# Patient Record
Sex: MALE | Race: WHITE
[De-identification: names, ages, dates, MRNs, and addresses within clinical notes are randomized per-mention and may not be internally consistent; named-entity substitution may affect disease eponyms.]

---

## 2021-03-29 PROBLEM — Z00.00 ENCOUNTER FOR PREVENTIVE HEALTH EXAMINATION: Status: ACTIVE | Noted: 2021-03-29

## 2021-03-30 ENCOUNTER — APPOINTMENT (OUTPATIENT)
Dept: CARDIOLOGY | Facility: CLINIC | Age: 76
End: 2021-03-30
Payer: MEDICARE

## 2021-03-30 ENCOUNTER — NON-APPOINTMENT (OUTPATIENT)
Age: 76
End: 2021-03-30

## 2021-03-30 VITALS
SYSTOLIC BLOOD PRESSURE: 155 MMHG | OXYGEN SATURATION: 99 % | HEART RATE: 65 BPM | WEIGHT: 173 LBS | TEMPERATURE: 98 F | DIASTOLIC BLOOD PRESSURE: 70 MMHG

## 2021-03-30 VITALS — SYSTOLIC BLOOD PRESSURE: 144 MMHG | DIASTOLIC BLOOD PRESSURE: 70 MMHG

## 2021-03-30 PROCEDURE — 99204 OFFICE O/P NEW MOD 45 MIN: CPT

## 2021-03-30 PROCEDURE — 93000 ELECTROCARDIOGRAM COMPLETE: CPT

## 2021-03-30 RX ORDER — METOPROLOL TARTRATE 50 MG/1
50 TABLET, FILM COATED ORAL DAILY
Qty: 90 | Refills: 3 | Status: DISCONTINUED | COMMUNITY
End: 2021-03-30

## 2021-03-30 RX ORDER — ASPIRIN 81 MG
81 TABLET, DELAYED RELEASE (ENTERIC COATED) ORAL
Refills: 0 | Status: DISCONTINUED | COMMUNITY
End: 2021-03-30

## 2021-03-30 RX ORDER — OXYBUTYNIN CHLORIDE 10 MG/1
10 TABLET, EXTENDED RELEASE ORAL DAILY
Qty: 90 | Refills: 3 | Status: ACTIVE | COMMUNITY

## 2021-03-30 NOTE — HISTORY OF PRESENT ILLNESS
[FreeTextEntry1] : 76-year-old male with longstanding history of hypertension, recently hospitalized at [facility] with atrial fibrillation/rapid ventricular rates.\par \par Patient denies any prior history of coronary artery disease and states he had a stress test done approximately 1 year ago by a cardiologist in [place].  He was suffering at that time from worsening leg pains and was ruled out for peripheral arterial disease as well.  He has a longstanding history of hypertension which has been somewhat controlled (states his blood pressures generally in the 130s to 140s) on beta-blocker and clonidine.\par \par Patient was moving his home contents from [place] to [place] and noticed after a strenuous day of activity that he felt nauseous and weak and was brought by ambulance to the hospital.  He stayed there for 2 days and had cardiac testing done.  Discharged on a combination of beta-blocker, clonidine, amlodipine, Eliquis and Lipitor.  The day after his discharge he felt very lightheaded and nauseous so he self discontinued his amlodipine and Lipitor and has only been taking his Eliquis sporadically.  Currently he is feeling back to baseline.  He denies any further chest pain, dyspnea, palpitations or syncopal episodes.\par \par He is an ex-smoker quit 25 years ago.  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  No significant cardiac family history.   with 2 children.

## 2021-03-30 NOTE — DISCUSSION/SUMMARY
[FreeTextEntry1] : We had a lengthy discussion regarding the nature of atrial fibrillation and the need to be on full dose anticoagulation to prevent the sequelae of cerebrovascular events.  Patient self administers low-dose aspirin in addition to his anticoagulants and I informed him this may put him at increased risk for GI bleed and if there is no indication of coronary disease, he is better off just staying on the Eliquis alone.  His most recent labs done by his primary care physician showed a normal cholesterol level and although there is some benefit to atrial fibrillation management with statins, the patient feels that this caused him significant amount of side effects.\par His blood pressures remain elevated and some of this may be due to whitecoat hypertension as he claims at his primary care physician his systolic blood pressure was in the 120s; I would like to get him onto a simpler medical regimen and to eventually wean him off clonidine to manage him with just a beta-blocker and an angiotensin receptor blocker.  His clonidine dose was cut in half 0.1 mg twice daily and he was started on low-dose valsartan.  He is to continue monitoring his blood pressures and bring me a log at his next visit and we will hopefully discontinue clonidine and titrate ARB as needed.  I have requested copies of his testing done at the hospital so that I can review them with the patient.  The patient was told that if he feels dizziness or his blood pressures seem excessively high or low that he should call the office.\par His [id number] score was 3 which is moderate to high risk for thromboembolic events while he is in atrial fibrillation.  Currently he is in sinus rhythm and will likely reassess at a later date to see whether or not there is occult dysrhythmia and consider risk benefit of continued anticoagulation.

## 2021-03-30 NOTE — PHYSICAL EXAM
[General Appearance - Well Developed] : well developed [Normal Appearance] : normal appearance [Well Groomed] : well groomed [General Appearance - Well Nourished] : well nourished [No Deformities] : no deformities [General Appearance - In No Acute Distress] : no acute distress [Normal Conjunctiva] : the conjunctiva exhibited no abnormalities [Eyelids - No Xanthelasma] : the eyelids demonstrated no xanthelasmas [Normal Jugular Venous A Waves Present] : normal jugular venous A waves present [Normal Jugular Venous V Waves Present] : normal jugular venous V waves present [No Jugular Venous Lane A Waves] : no jugular venous lane A waves [Heart Rate And Rhythm] : heart rate and rhythm were normal [Heart Sounds] : normal S1 and S2 [Murmurs] : no murmurs present [Respiration, Rhythm And Depth] : normal respiratory rhythm and effort [Exaggerated Use Of Accessory Muscles For Inspiration] : no accessory muscle use [Auscultation Breath Sounds / Voice Sounds] : lungs were clear to auscultation bilaterally [Abdomen Soft] : soft [Abdomen Tenderness] : non-tender [Abdomen Mass (___ Cm)] : no abdominal mass palpated [Abnormal Walk] : normal gait [Gait - Sufficient For Exercise Testing] : the gait was sufficient for exercise testing [Nail Clubbing] : no clubbing of the fingernails [Cyanosis, Localized] : no localized cyanosis [Petechial Hemorrhages (___cm)] : no petechial hemorrhages [Skin Color & Pigmentation] : normal skin color and pigmentation [] : no rash [No Venous Stasis] : no venous stasis [Skin Lesions] : no skin lesions [No Skin Ulcers] : no skin ulcer [No Xanthoma] : no  xanthoma was observed [Affect] : the affect was normal [Oriented To Time, Place, And Person] : oriented to person, place, and time [Mood] : the mood was normal [No Anxiety] : not feeling anxious

## 2021-04-27 ENCOUNTER — APPOINTMENT (OUTPATIENT)
Dept: CARDIOLOGY | Facility: CLINIC | Age: 76
End: 2021-04-27
Payer: MEDICARE

## 2021-04-27 VITALS
DIASTOLIC BLOOD PRESSURE: 64 MMHG | TEMPERATURE: 97.5 F | SYSTOLIC BLOOD PRESSURE: 140 MMHG | BODY MASS INDEX: 24.77 KG/M2 | HEIGHT: 70 IN | OXYGEN SATURATION: 99 % | WEIGHT: 173 LBS | HEART RATE: 56 BPM

## 2021-04-27 PROCEDURE — 99213 OFFICE O/P EST LOW 20 MIN: CPT

## 2021-04-27 NOTE — DISCUSSION/SUMMARY
[FreeTextEntry1] : PAF now in NSR.\par Poorly controlled HTN, reviewed results of home measured blood pressure logs and generally blood pressures remain in the 140s to 150s range.  Patient states he has been compliant with his medical regimen.  Will increase valsartan to 160 mg, at next visit if BPs remain elevated will change to combination of Diovan HCT.\par His [id number] score was 3 which is moderate to high risk for thromboembolic events, will likely reassess at a later date to decide whether or not to continue DOAC.\par \par Needs tooth extracted, no contraindication.  May hold Eliquis up to 48 hours prior to planned procedure but would prefer if patient can remain on Eliquis over the course of the procedure.

## 2021-04-27 NOTE — CARDIOLOGY SUMMARY
[de-identified] : 3/30/21, Sinus Rhythm \par -Poor R-wave progression -nonspecific -consider old anterior infarct.

## 2021-04-27 NOTE — HISTORY OF PRESENT ILLNESS
[FreeTextEntry1] : 76-year-old male with longstanding history of hypertension, recently hospitalized at [facility] with (new?) atrial fibrillation/rapid ventricular rates. No prior history of coronary artery disease and states he had a stress test done approximately 1 year ago by a cardiologist in [place] (records unavailable).   He was suffering at that time from worsening leg pains and was ruled out for peripheral arterial disease as well.  \par  He denies any further chest pain, dyspnea, palpitations or syncopal episodes.\par \par Ex-smoker quit 25 years ago.  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  No significant cardiac family history.   with 2 children.

## 2021-07-13 ENCOUNTER — APPOINTMENT (OUTPATIENT)
Dept: CARDIOLOGY | Facility: CLINIC | Age: 76
End: 2021-07-13
Payer: MEDICARE

## 2021-07-13 ENCOUNTER — NON-APPOINTMENT (OUTPATIENT)
Age: 76
End: 2021-07-13

## 2021-07-13 VITALS
HEIGHT: 70 IN | BODY MASS INDEX: 24.91 KG/M2 | TEMPERATURE: 97.8 F | WEIGHT: 174 LBS | SYSTOLIC BLOOD PRESSURE: 120 MMHG | DIASTOLIC BLOOD PRESSURE: 60 MMHG | HEART RATE: 64 BPM | OXYGEN SATURATION: 96 %

## 2021-07-13 PROCEDURE — 99213 OFFICE O/P EST LOW 20 MIN: CPT

## 2021-07-13 PROCEDURE — 93000 ELECTROCARDIOGRAM COMPLETE: CPT

## 2021-07-13 RX ORDER — CLONIDINE HYDROCHLORIDE 0.1 MG/1
0.1 TABLET ORAL
Qty: 30 | Refills: 0 | Status: DISCONTINUED | COMMUNITY
End: 2021-07-13

## 2021-07-13 RX ORDER — VALSARTAN 160 MG/1
160 TABLET, COATED ORAL DAILY
Qty: 90 | Refills: 3 | Status: DISCONTINUED | COMMUNITY
Start: 2021-03-30 | End: 2021-07-13

## 2021-07-13 NOTE — CARDIOLOGY SUMMARY
[de-identified] : 7/13/21, Sinus  Rhythm \par Low voltage in precordial leads. \par  -Poor R-wave progression -may be secondary to pulmonary disease   consider old anterior infarct. 3/30/21, Sinus Rhythm \par -Poor R-wave progression -nonspecific -consider old anterior infarct.

## 2021-07-13 NOTE — DISCUSSION/SUMMARY
[FreeTextEntry1] : PAF now in NSR.\par Improved HTN, reviewed results of home measured blood pressure logs and generally blood pressures remain in the 120-130's range.   Will change Valsartan to Valsartan HCTZ 160/12.5 mg,and d/c Clonidine (less desirable in elderly because of neuro effects).\par His [id number] score was 3 which is moderate to high risk for thromboembolic events, will continue DOAC. He was only getting Eliquis once daily, changed to more appropriate twice daily. Should have CBc every 6 months at least.\par \par

## 2021-07-13 NOTE — HISTORY OF PRESENT ILLNESS
[FreeTextEntry1] : 76-year-old male with longstanding history of hypertension, recently hospitalized at [facility] with (new?) atrial fibrillation/rapid ventricular rates. No prior history of coronary artery disease and states he had a stress test done approximately 1 year ago by a cardiologist in [place] (records unavailable).   He was suffering at that time from worsening leg pains and was ruled out for peripheral arterial disease as well.  \par  He denies any further chest pain, dyspnea, palpitations or syncopal episodes.\par \par Ex-smoker quit 25 years ago.  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  No significant cardiac family history.   with 2 children.

## 2022-01-11 ENCOUNTER — APPOINTMENT (OUTPATIENT)
Dept: CARDIOLOGY | Facility: CLINIC | Age: 77
End: 2022-01-11
Payer: MEDICARE

## 2022-01-11 ENCOUNTER — NON-APPOINTMENT (OUTPATIENT)
Age: 77
End: 2022-01-11

## 2022-01-11 VITALS
TEMPERATURE: 97.8 F | DIASTOLIC BLOOD PRESSURE: 70 MMHG | SYSTOLIC BLOOD PRESSURE: 136 MMHG | WEIGHT: 183 LBS | BODY MASS INDEX: 26.2 KG/M2 | HEART RATE: 68 BPM | HEIGHT: 70 IN | OXYGEN SATURATION: 97 %

## 2022-01-11 PROCEDURE — 93000 ELECTROCARDIOGRAM COMPLETE: CPT

## 2022-01-11 PROCEDURE — 99213 OFFICE O/P EST LOW 20 MIN: CPT

## 2022-01-11 NOTE — DISCUSSION/SUMMARY
[FreeTextEntry1] : PAF , remains in NSR.\par \par Improved HTN, reviewed results of home measured blood pressure logs and generally blood pressures remain in the 120's range.   Will continue current Rx.\par His [id number] score was 3 which is moderate to high risk for thromboembolic events, will continue DOAC. Labs requested.\par \par vaccinated but not yet boosted, d/w patient importance of vaccine booster.\par \par

## 2022-01-11 NOTE — HISTORY OF PRESENT ILLNESS
[FreeTextEntry1] : 76-year-old male with longstanding history of hypertension, hospitalized in 2/21 at [facility] with (new?) atrial fibrillation/rapid ventricular rates. No prior history of coronary artery disease and states he had a stress test done approximately 1 year ago by a cardiologist in [place] (records unavailable).   He was suffering at that time from worsening leg pains and was ruled out for peripheral arterial disease as well.  \par  He denies any further chest pain, dyspnea, palpitations or syncopal episodes.\par \par Ex-smoker quit 25 years ago.  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  No significant cardiac family history.   with 2 children.

## 2022-01-11 NOTE — CARDIOLOGY SUMMARY
[de-identified] : 1/11/22, Sinus  Rhythm  - occasional ectopic ventricular beat   (poor tracing)\par P:QRS - 1:1, Abnormal P axis, H Rate 66\par -Poor R-wave progression -may be secondary to pulmonary disease   consider old anterior infarct. \par  Low voltage -possible pulmonary disease. \par 7/13/21, Sinus  Rhythm \par Low voltage in precordial leads. \par  -Poor R-wave progression -may be secondary to pulmonary disease   consider old anterior infarct. 3/30/21, Sinus Rhythm \par -Poor R-wave progression -nonspecific -consider old anterior infarct.

## 2022-01-14 LAB
ALBUMIN SERPL ELPH-MCNC: 4.5 G/DL
ALP BLD-CCNC: 50 U/L
ALT SERPL-CCNC: 15 U/L
ANION GAP SERPL CALC-SCNC: 15 MMOL/L
AST SERPL-CCNC: 18 U/L
BASOPHILS # BLD AUTO: 0.05 K/UL
BASOPHILS NFR BLD AUTO: 0.4 %
BILIRUB SERPL-MCNC: 0.5 MG/DL
BUN SERPL-MCNC: 22 MG/DL
CALCIUM SERPL-MCNC: 10.2 MG/DL
CHLORIDE SERPL-SCNC: 102 MMOL/L
CHOLEST SERPL-MCNC: 223 MG/DL
CO2 SERPL-SCNC: 27 MMOL/L
CREAT SERPL-MCNC: 1.68 MG/DL
EOSINOPHIL # BLD AUTO: 0.6 K/UL
EOSINOPHIL NFR BLD AUTO: 5.1 %
ESTIMATED AVERAGE GLUCOSE: 114 MG/DL
GLUCOSE SERPL-MCNC: 133 MG/DL
HBA1C MFR BLD HPLC: 5.6 %
HCT VFR BLD CALC: 45.3 %
HDLC SERPL-MCNC: 41 MG/DL
HGB BLD-MCNC: 14.8 G/DL
IMM GRANULOCYTES NFR BLD AUTO: 0.4 %
LDLC SERPL CALC-MCNC: 140 MG/DL
LYMPHOCYTES # BLD AUTO: 2.79 K/UL
LYMPHOCYTES NFR BLD AUTO: 23.5 %
MAGNESIUM SERPL-MCNC: 2 MG/DL
MAN DIFF?: NORMAL
MCHC RBC-ENTMCNC: 32 PG
MCHC RBC-ENTMCNC: 32.7 GM/DL
MCV RBC AUTO: 98.1 FL
MONOCYTES # BLD AUTO: 1.08 K/UL
MONOCYTES NFR BLD AUTO: 9.1 %
NEUTROPHILS # BLD AUTO: 7.31 K/UL
NEUTROPHILS NFR BLD AUTO: 61.5 %
NONHDLC SERPL-MCNC: 182 MG/DL
PLATELET # BLD AUTO: 341 K/UL
POTASSIUM SERPL-SCNC: 4.6 MMOL/L
PROT SERPL-MCNC: 6.8 G/DL
RBC # BLD: 4.62 M/UL
RBC # FLD: 12.2 %
SODIUM SERPL-SCNC: 144 MMOL/L
TRIGL SERPL-MCNC: 208 MG/DL
TSH SERPL-ACNC: 2.8 UIU/ML
WBC # FLD AUTO: 11.88 K/UL

## 2022-01-14 RX ORDER — ATORVASTATIN CALCIUM 10 MG/1
10 TABLET, FILM COATED ORAL
Qty: 90 | Refills: 2 | Status: ACTIVE | COMMUNITY
Start: 2022-01-14 | End: 1900-01-01

## 2022-01-14 RX ORDER — VALSARTAN AND HYDROCHLOROTHIAZIDE 160; 12.5 MG/1; MG/1
160-12.5 TABLET, FILM COATED ORAL DAILY
Qty: 90 | Refills: 2 | Status: ACTIVE | COMMUNITY
Start: 2021-07-13 | End: 1900-01-01

## 2022-01-14 RX ORDER — METOPROLOL SUCCINATE 50 MG/1
50 TABLET, EXTENDED RELEASE ORAL DAILY
Qty: 90 | Refills: 3 | Status: ACTIVE | COMMUNITY
Start: 2021-03-30 | End: 1900-01-01

## 2022-07-12 ENCOUNTER — APPOINTMENT (OUTPATIENT)
Dept: CARDIOLOGY | Facility: CLINIC | Age: 77
End: 2022-07-12

## 2022-07-12 ENCOUNTER — NON-APPOINTMENT (OUTPATIENT)
Age: 77
End: 2022-07-12

## 2022-07-12 VITALS
OXYGEN SATURATION: 96 % | TEMPERATURE: 97.6 F | BODY MASS INDEX: 25.77 KG/M2 | SYSTOLIC BLOOD PRESSURE: 120 MMHG | WEIGHT: 180 LBS | HEART RATE: 66 BPM | HEIGHT: 70 IN | DIASTOLIC BLOOD PRESSURE: 60 MMHG

## 2022-07-12 PROCEDURE — 99213 OFFICE O/P EST LOW 20 MIN: CPT

## 2022-07-12 PROCEDURE — 93000 ELECTROCARDIOGRAM COMPLETE: CPT

## 2022-07-12 NOTE — CARDIOLOGY SUMMARY
[de-identified] : 7/12/22, Atrial Rhythm, Abnormal P axis, H Rate 66, -Poor R-wave progression -nonspecific -consider old anterior infarct.\par 1/11/22, Sinus  Rhythm  - occasional ectopic ventricular beat   (poor tracing), P:QRS - 1:1, Abnormal P axis, H Rate 66\par -Poor R-wave progression -may be secondary to pulmonary disease   consider old anterior infarct.  Low voltage -possible pulmonary disease. \par 7/13/21, Sinus  Rhythm , Low voltage in precordial leads.  -Poor R-wave progression -may be secondary to pulmonary disease   consider old anterior infarct. \par 3/30/21, Sinus Rhythm, -Poor R-wave progression -nonspecific -consider old anterior infarct.

## 2022-07-12 NOTE — HISTORY OF PRESENT ILLNESS
[FreeTextEntry1] : 77-year-old male with longstanding history of hypertension, hospitalized in 2/21 at [facility] with (new?) atrial fibrillation/rapid ventricular rates. No prior history of coronary artery disease and states he had a stress test done in 2020 by a cardiologist in [place] (records unavailable).   He was suffering at that time from worsening leg pains and was ruled out for peripheral arterial disease as well.  \par  He denies any further chest pain, dyspnea, palpitations or syncopal episodes.\par \par Ex-smoker quit 25 years ago.  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  No significant cardiac family history.   with 2 children.

## 2022-07-12 NOTE — DISCUSSION/SUMMARY
[___ Month(s)] : in [unfilled] month(s) [FreeTextEntry1] : History of PAF, remains in NSR.\par \par Good HTN control, reviewed results of home measured blood pressure logs and generally blood pressures remain in the 120's range.   Will continue current Rx.\par His [id number] score was 3 which is moderate to high risk for thromboembolic events, will continue DOAC. Labs requested.\par \par \par

## 2022-07-13 LAB
ALBUMIN SERPL ELPH-MCNC: 4.3 G/DL
ALP BLD-CCNC: 50 U/L
ALT SERPL-CCNC: 11 U/L
ANION GAP SERPL CALC-SCNC: 13 MMOL/L
AST SERPL-CCNC: 17 U/L
BASOPHILS # BLD AUTO: 0.04 K/UL
BASOPHILS NFR BLD AUTO: 0.4 %
BILIRUB SERPL-MCNC: 0.5 MG/DL
BUN SERPL-MCNC: 24 MG/DL
CALCIUM SERPL-MCNC: 9 MG/DL
CHLORIDE SERPL-SCNC: 102 MMOL/L
CHOLEST SERPL-MCNC: 137 MG/DL
CO2 SERPL-SCNC: 26 MMOL/L
CREAT SERPL-MCNC: 1.72 MG/DL
EGFR: 40 ML/MIN/1.73M2
EOSINOPHIL # BLD AUTO: 0.47 K/UL
EOSINOPHIL NFR BLD AUTO: 4.9 %
ESTIMATED AVERAGE GLUCOSE: 117 MG/DL
GLUCOSE SERPL-MCNC: 112 MG/DL
HBA1C MFR BLD HPLC: 5.7 %
HCT VFR BLD CALC: 40.4 %
HDLC SERPL-MCNC: 34 MG/DL
HGB BLD-MCNC: 13.3 G/DL
IMM GRANULOCYTES NFR BLD AUTO: 0.5 %
LDLC SERPL CALC-MCNC: 60 MG/DL
LYMPHOCYTES # BLD AUTO: 2.18 K/UL
LYMPHOCYTES NFR BLD AUTO: 22.7 %
MAN DIFF?: NORMAL
MCHC RBC-ENTMCNC: 32.3 PG
MCHC RBC-ENTMCNC: 32.9 GM/DL
MCV RBC AUTO: 98.1 FL
MONOCYTES # BLD AUTO: 0.99 K/UL
MONOCYTES NFR BLD AUTO: 10.3 %
NEUTROPHILS # BLD AUTO: 5.89 K/UL
NEUTROPHILS NFR BLD AUTO: 61.2 %
NONHDLC SERPL-MCNC: 103 MG/DL
PLATELET # BLD AUTO: 309 K/UL
POTASSIUM SERPL-SCNC: 4.2 MMOL/L
PROT SERPL-MCNC: 6.4 G/DL
RBC # BLD: 4.12 M/UL
RBC # FLD: 12.1 %
SODIUM SERPL-SCNC: 142 MMOL/L
TRIGL SERPL-MCNC: 215 MG/DL
TSH SERPL-ACNC: 2.43 UIU/ML
WBC # FLD AUTO: 9.62 K/UL

## 2023-01-10 ENCOUNTER — APPOINTMENT (OUTPATIENT)
Dept: CARDIOLOGY | Facility: CLINIC | Age: 78
End: 2023-01-10
Payer: MEDICARE

## 2023-01-10 ENCOUNTER — NON-APPOINTMENT (OUTPATIENT)
Age: 78
End: 2023-01-10

## 2023-01-10 VITALS
SYSTOLIC BLOOD PRESSURE: 116 MMHG | DIASTOLIC BLOOD PRESSURE: 60 MMHG | HEIGHT: 70 IN | TEMPERATURE: 98.2 F | HEART RATE: 70 BPM | OXYGEN SATURATION: 96 % | WEIGHT: 186 LBS | BODY MASS INDEX: 26.63 KG/M2

## 2023-01-10 PROCEDURE — 93000 ELECTROCARDIOGRAM COMPLETE: CPT

## 2023-01-10 PROCEDURE — 99213 OFFICE O/P EST LOW 20 MIN: CPT

## 2023-01-10 NOTE — DISCUSSION/SUMMARY
[___ Month(s)] : in [unfilled] month(s) [FreeTextEntry1] : History of PAF, remains in NSR.\par \par Good HTN control, reviewed results of home measured blood pressure logs and generally blood pressures remain in the 120's range.   Will continue current Rx.\par His [id number] score was 3 which is moderate to high risk for thromboembolic events, will continue DOAC. Labs requested.\par \par Increased exercise recommended, needs to watch diet closer, significant weight gain noted.\par \par \par

## 2023-01-10 NOTE — CARDIOLOGY SUMMARY
[de-identified] : 1/10/23, Atrial  Rhythm, Abnormal P axis, -Poor R-wave progression -nonspecific -consider old anterior infarct. \par 7/12/22, Atrial Rhythm, Abnormal P axis, H Rate 66, -Poor R-wave progression -nonspecific -consider old anterior infarct.\par 1/11/22, Sinus  Rhythm  - occasional ectopic ventricular beat   (poor tracing), Abnormal P axis, H Rate 66\par -Poor R-wave progression -may be secondary to pulmonary disease   consider old anterior infarct.  Low voltage -possible pulmonary disease. \par 7/13/21, Sinus  Rhythm , Low voltage in precordial leads.  -Poor R-wave progression -may be secondary to pulmonary disease   consider old anterior infarct. \par 3/30/21, Sinus Rhythm, -Poor R-wave progression -nonspecific -consider old anterior infarct.

## 2023-01-10 NOTE — HISTORY OF PRESENT ILLNESS
[FreeTextEntry1] : 77-year-old male with longstanding history of hypertension, hospitalized in 2/21 at [facility] with (new?) atrial fibrillation/rapid ventricular rates. No prior history of coronary artery disease and states he had a stress test done in 2020 by a cardiologist in [place] (records unavailable).   He was suffering at that time from worsening leg pains and was ruled out for peripheral arterial disease as well.  \par  He denies any further chest pain, dyspnea, palpitations or syncopal episodes.\par \par Ex-smoker quit 25 years ago.  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  No significant cardiac family history.   with 2 children.

## 2023-07-06 ENCOUNTER — RX RENEWAL (OUTPATIENT)
Age: 78
End: 2023-07-06

## 2023-08-15 ENCOUNTER — APPOINTMENT (OUTPATIENT)
Dept: CARDIOLOGY | Facility: CLINIC | Age: 78
End: 2023-08-15
Payer: MEDICARE

## 2023-08-15 ENCOUNTER — NON-APPOINTMENT (OUTPATIENT)
Age: 78
End: 2023-08-15

## 2023-08-15 VITALS
HEIGHT: 70 IN | HEART RATE: 76 BPM | BODY MASS INDEX: 25.91 KG/M2 | WEIGHT: 181 LBS | DIASTOLIC BLOOD PRESSURE: 64 MMHG | SYSTOLIC BLOOD PRESSURE: 110 MMHG | OXYGEN SATURATION: 97 %

## 2023-08-15 PROCEDURE — 99213 OFFICE O/P EST LOW 20 MIN: CPT

## 2023-08-15 PROCEDURE — 93000 ELECTROCARDIOGRAM COMPLETE: CPT

## 2023-08-15 NOTE — HISTORY OF PRESENT ILLNESS
[FreeTextEntry1] : 78-year-old male with longstanding history of hypertension, hospitalized in 2/21 at [facility] with (new?) atrial fibrillation/rapid ventricular rates. No prior history of coronary artery disease and states he had a stress test done in 2020 by a cardiologist in [place] (records unavailable).   He was suffering at that time from worsening leg pains and was ruled out for peripheral arterial disease as well.    He denies any further chest pain, dyspnea, palpitations or syncopal episodes.  Ex-smoker quit 25 years ago.  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  No significant cardiac family history.   with 2 children.

## 2023-08-15 NOTE — CARDIOLOGY SUMMARY
[de-identified] : 8/15/23, Atrial Rhythm, Abnormal P axis, -Poor R-wave progression -may be secondary to pulmonary disease  consider old anterior infarct. 1/10/23, Atrial  Rhythm, Abnormal P axis, -Poor R-wave progression -nonspecific -consider old anterior infarct.  7/12/22, Atrial Rhythm, Abnormal P axis, H Rate 66, -Poor R-wave progression -nonspecific -consider old anterior infarct. 1/11/22, Sinus  Rhythm  - occasional ectopic ventricular beat   (poor tracing), Abnormal P axis, H Rate 66 -Poor R-wave progression -may be secondary to pulmonary disease   consider old anterior infarct.  Low voltage -possible pulmonary disease.  7/13/21, Sinus  Rhythm , Low voltage in precordial leads.  -Poor R-wave progression -may be secondary to pulmonary disease   consider old anterior infarct.  3/30/21, Sinus Rhythm, -Poor R-wave progression -nonspecific -consider old anterior infarct.

## 2023-08-15 NOTE — DISCUSSION/SUMMARY
[___ Month(s)] : in [unfilled] month(s) [EKG obtained to assist in diagnosis and management of assessed problem(s)] : EKG obtained to assist in diagnosis and management of assessed problem(s) [FreeTextEntry1] : History of PAF, remains in NSR.  Good HTN control, reviewed results of home measured blood pressure logs and generally blood pressures remain in the 120's range.   Will continue current Rx. His [id number] score was 3 which is moderate to high risk for thromboembolic events, will continue DOAC. Labs (viri CBC) recommended every 6 months, scheduled next in September with PCP. Next visit TTE to assess for LV function given history.

## 2024-04-30 ENCOUNTER — NON-APPOINTMENT (OUTPATIENT)
Age: 79
End: 2024-04-30

## 2024-04-30 ENCOUNTER — APPOINTMENT (OUTPATIENT)
Dept: CARDIOLOGY | Facility: CLINIC | Age: 79
End: 2024-04-30
Payer: MEDICARE

## 2024-04-30 VITALS
SYSTOLIC BLOOD PRESSURE: 134 MMHG | DIASTOLIC BLOOD PRESSURE: 70 MMHG | HEART RATE: 64 BPM | WEIGHT: 183 LBS | HEIGHT: 70 IN | OXYGEN SATURATION: 96 % | BODY MASS INDEX: 26.2 KG/M2

## 2024-04-30 DIAGNOSIS — E78.5 HYPERLIPIDEMIA, UNSPECIFIED: ICD-10-CM

## 2024-04-30 DIAGNOSIS — I48.91 UNSPECIFIED ATRIAL FIBRILLATION: ICD-10-CM

## 2024-04-30 DIAGNOSIS — I10 ESSENTIAL (PRIMARY) HYPERTENSION: ICD-10-CM

## 2024-04-30 PROCEDURE — 93000 ELECTROCARDIOGRAM COMPLETE: CPT

## 2024-04-30 PROCEDURE — G2211 COMPLEX E/M VISIT ADD ON: CPT

## 2024-04-30 PROCEDURE — 99214 OFFICE O/P EST MOD 30 MIN: CPT

## 2024-04-30 RX ORDER — APIXABAN 5 MG/1
5 TABLET, FILM COATED ORAL
Qty: 180 | Refills: 2 | Status: ACTIVE | COMMUNITY
Start: 2023-07-06 | End: 1900-01-01

## 2024-04-30 NOTE — CARDIOLOGY SUMMARY
[de-identified] : 8/15/23, Atrial Rhythm, Abnormal P axis, -Poor R-wave progression -may be secondary to pulmonary disease  consider old anterior infarct. 1/10/23, Atrial  Rhythm, Abnormal P axis, -Poor R-wave progression -nonspecific -consider old anterior infarct.  7/12/22, Atrial Rhythm, Abnormal P axis, H Rate 66, -Poor R-wave progression -nonspecific -consider old anterior infarct. 1/11/22, Sinus  Rhythm  - occasional ectopic ventricular beat   (poor tracing), Abnormal P axis, H Rate 66 -Poor R-wave progression -may be secondary to pulmonary disease   consider old anterior infarct.  Low voltage -possible pulmonary disease.  7/13/21, Sinus  Rhythm , Low voltage in precordial leads.  -Poor R-wave progression -may be secondary to pulmonary disease   consider old anterior infarct.  3/30/21, Sinus Rhythm, -Poor R-wave progression -nonspecific -consider old anterior infarct.

## 2024-04-30 NOTE — HISTORY OF PRESENT ILLNESS
[FreeTextEntry1] : 79-year-old male with longstanding history of hypertension, hospitalized in 2/21 at [facility] with (new?) atrial fibrillation/rapid ventricular rates. No prior history of coronary artery disease and states he had a stress test done in 2020 by a cardiologist in [place] (records unavailable).   He was suffering at that time from worsening leg pains and was ruled out for peripheral arterial disease as well.    He denies any further chest pain, dyspnea, palpitations or syncopal episodes.  Ex-smoker quit 25 years ago.  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  No significant cardiac family history.   with 2 children.

## 2024-04-30 NOTE — DISCUSSION/SUMMARY
[___ Month(s)] : in [unfilled] month(s) [EKG obtained to assist in diagnosis and management of assessed problem(s)] : EKG obtained to assist in diagnosis and management of assessed problem(s) [FreeTextEntry1] : History of PAF, remains in NSR.  Good HTN control, reviewed results of home measured blood pressure logs and generally blood pressures remain in the 120's range.   Will continue current Rx. His [id number] score was 3 which is moderate to high risk for thromboembolic events, will continue DOAC. Labs (viri CBC) recommended every 6 months, scheduled next in September with PCP. Next visit TTE to assess for LV function given history.

## 2024-11-05 ENCOUNTER — APPOINTMENT (OUTPATIENT)
Dept: CARDIOLOGY | Facility: CLINIC | Age: 79
End: 2024-11-05
Payer: MEDICARE

## 2024-11-05 ENCOUNTER — NON-APPOINTMENT (OUTPATIENT)
Age: 79
End: 2024-11-05

## 2024-11-05 VITALS
BODY MASS INDEX: 25.77 KG/M2 | HEART RATE: 68 BPM | HEIGHT: 70 IN | DIASTOLIC BLOOD PRESSURE: 60 MMHG | SYSTOLIC BLOOD PRESSURE: 120 MMHG | OXYGEN SATURATION: 96 % | WEIGHT: 180 LBS

## 2024-11-05 PROCEDURE — 99214 OFFICE O/P EST MOD 30 MIN: CPT

## 2024-11-05 PROCEDURE — G2211 COMPLEX E/M VISIT ADD ON: CPT

## 2024-11-05 PROCEDURE — 93000 ELECTROCARDIOGRAM COMPLETE: CPT

## 2025-05-06 ENCOUNTER — APPOINTMENT (OUTPATIENT)
Dept: CARDIOLOGY | Facility: CLINIC | Age: 80
End: 2025-05-06
Payer: MEDICARE

## 2025-05-06 ENCOUNTER — NON-APPOINTMENT (OUTPATIENT)
Age: 80
End: 2025-05-06

## 2025-05-06 VITALS
SYSTOLIC BLOOD PRESSURE: 116 MMHG | HEIGHT: 70 IN | HEART RATE: 61 BPM | OXYGEN SATURATION: 94 % | DIASTOLIC BLOOD PRESSURE: 60 MMHG | WEIGHT: 178 LBS | BODY MASS INDEX: 25.48 KG/M2

## 2025-05-06 DIAGNOSIS — I48.91 UNSPECIFIED ATRIAL FIBRILLATION: ICD-10-CM

## 2025-05-06 DIAGNOSIS — I10 ESSENTIAL (PRIMARY) HYPERTENSION: ICD-10-CM

## 2025-05-06 DIAGNOSIS — E78.5 HYPERLIPIDEMIA, UNSPECIFIED: ICD-10-CM

## 2025-05-06 PROCEDURE — 99214 OFFICE O/P EST MOD 30 MIN: CPT

## 2025-05-06 PROCEDURE — 93306 TTE W/DOPPLER COMPLETE: CPT

## 2025-05-06 PROCEDURE — G2211 COMPLEX E/M VISIT ADD ON: CPT

## 2025-05-06 PROCEDURE — 93000 ELECTROCARDIOGRAM COMPLETE: CPT

## 2025-05-06 RX ORDER — OXYBUTYNIN CHLORIDE 10 MG/1
10 TABLET, EXTENDED RELEASE ORAL
Refills: 0 | Status: ACTIVE | COMMUNITY